# Patient Record
Sex: FEMALE | Race: WHITE | NOT HISPANIC OR LATINO
[De-identification: names, ages, dates, MRNs, and addresses within clinical notes are randomized per-mention and may not be internally consistent; named-entity substitution may affect disease eponyms.]

---

## 2016-12-26 NOTE — H&P ADULT. - COMMENTS
Patient responds no to all review of systems questions, but patient altered at bedside. Unable to obtain full ROS

## 2016-12-26 NOTE — H&P ADULT. - ASSESSMENT
81 y/o F PMH HTN, breast ca s/p bilateral mastectomy, diverticulitis, hx of CHF?, presents with sepsis, source unclear

## 2016-12-26 NOTE — ED ADULT TRIAGE NOTE - CHIEF COMPLAINT QUOTE
Brought in for AMS, abdominal pain, and nausea/vomiting from family home. Family reports AMS started yesterday. 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 Sister name is Joann. Receiving RNs aware pt has b/l mastectomies and need VS. Transferred to [facility].

## 2016-12-26 NOTE — H&P ADULT. - HISTORY OF PRESENT ILLNESS
Patient is a 81 y/o F PMH HTN, breast ca diagnosed ~ 30 years ago s/p b/l mastectomy, history of heart failure?, hx of diverticulitis, who presents with AMS and profuse diarrhea from home. [place] AA[place] x 3. At bedside, patient is ~ AAO x 2, history obtained from nephew. Nephew states that patient was visiting family on Christmas dmitri, when she was noted to have diarrhea and slumped over the toilet. Appeared flushed and lethargic. Family placed her in bed. Yesterday morning, patient had temperature of 101, but after sleeping for several extra hours, returned to her baseline. However, last night, she was found in bed covered with feces, confused. Family brought her to the ED. At bedside, patient reports taking antibiotics, but cannot say when or for how long. Denies current fevers, chills, chest pain, SOB, abdominal pain, nausea, vomiting, diarrhea.     In ED:  Vitals - 97.5, HR 98, BP 83/43, RR 20, O2 sat 95%  Given 2L NS x 1, 1L NS x 1, cipro and flagyl ordered x 1; started on levophed as systolic blood pressure dropped to 70's Patient is a 83 y/o F PMH HTN, breast ca diagnosed ~ 30 years ago s/p b/l mastectomy, history of heart failure?, hx of diverticulitis, who presents with AMS and profuse diarrhea from home. [place] AAO x 3. At bedside, patient is ~ AAO x 2, history obtained from nephew. Nephew states that patient was visiting family on Ghulam dmitri, when she was noted to have diarrhea and slumped over the toilet. Appeared flushed and lethargic. Family placed her in bed. Yesterday morning, patient had temperature of 101, but after sleeping for several extra hours, returned to her baseline. However, last night, she was found in bed covered with feces, confused. Family brought her to the ED. At bedside, patient reports taking antibiotics, but cannot say when or for how long. Denies current fevers, chills, chest pain, SOB, abdominal pain, nausea, vomiting, diarrhea.   Per the family, the HCP is unknown. Possible decision maker is someone within the [employer] to make [facility] decisions.     In ED:  Vitals - 97.5, HR 98, BP 83/43, RR 20, O2 sat 95%  Given 2L NS x 1, 1L NS x 1, cipro and flagyl ordered x 1; started on levophed as systolic blood pressure dropped to 70's

## 2016-12-26 NOTE — H&P ADULT. - PROBLEM SELECTOR PLAN 1
- 2/2 to infectious cause from a GI source given the recent hx of diarrhea   - lacate of 4.4  - Hypotensive in the ED needing Levophed   - Blood, Urine and stool cultures   - Ova and parasite and C. Diff colitis sent as well  - Cipro and flagyl

## 2016-12-26 NOTE — ED PROVIDER NOTE - PROGRESS NOTE DETAILS
as per Dr. Crouch - EF 60s, calc Aortic stenosis, on ASA, metprolol 25, Enalapril 5mg BID, Gabapentin 300TID, HCTZ12.5 QD, letrozol 2.5, atorv 20, vits.

## 2016-12-26 NOTE — H&P ADULT. - LAB RESULTS AND INTERPRETATION
Blood work personally reviewed - notable for WBC ~19 with 13% bands; elevated lactate of 4.4; acute renal failure with Cr of 4; metabolic acidosis

## 2016-12-26 NOTE — ED PROVIDER NOTE - OBJECTIVE STATEMENT
82F with nephew thsi weekend, ahd n/diarrea 12/24, felt better next day but now tired/lethargic, this AM had diarrhea all over the comade;    FS: 145, BP calf: 82/60;   EKG: NSR   pmh: b/l mastectomy  meds: unknown;  family: coming;    ? slurred speech per family; 82F h/o diverticulitis, and b/l mastectomy p/w diarrhea  since12/24, felt better next day but now tired/lethargic, this AM had diarrhea all over the comade; pt denies cp/sob/abdominal pain/vomiting or h/o cdiff in past;     FS: 145, BP calf: 82/60;   EKG: NSR   pmh: b/l mastectomy  meds: unknown;  family: coming;    ? slurred speech per family;

## 2016-12-26 NOTE — ED PROCEDURE NOTE - PROCEDURE ADDITIONAL DETAILS
Triple lumen central access placed in right IJ vein under dynamic ultrasound guidance under sterile conditions with catheter localized in the vein and confirmed on CXR.  Well tolerated and no complications.  See image and report in chart.  Bhumi [id number]
central line was in RA on x-ray, will be pulled back

## 2016-12-26 NOTE — ED ADULT NURSE NOTE - OBJECTIVE STATEMENT
patient brought in by EMS as AMS, as per family patient is been having abdominal pain with n/v/since 2 days, patient came in covered in dry feces .breathing spontaneously. CM shows sinus rhythm. sepsis work up done. continue to monitor.

## 2016-12-26 NOTE — ED PROVIDER NOTE - MEDICAL DECISION MAKING DETAILS
diarrhea, hypotensive liklely 2/2 diarrhea,  -cbc, cmp, central line, ivf, treat for pressumed cdiff, re-assess

## 2016-12-26 NOTE — ED PROVIDER NOTE - CRITICAL CARE PROVIDED
consult w/ pt's family directly relating to pts condition/direct patient care (not related to procedure)/consultation with other physicians/documentation

## 2016-12-26 NOTE — ED PROVIDER NOTE - ATTENDING CONTRIBUTION TO CARE
Manning: 82 yof with diverticulitis and bilateral mastectomy history, with diarrhea for 3 days found today weak and not very responsive with copious amounts of stool, EMS found pt to have low BP.  IN ED, pt awake, answers questions, no recent antibx use, BP 80s, , 97.5 rectal, green stool all over pt and legs and bed, cold clammy ext with poor peripheral pulses, clear lungs, chest with bilateral mast scars, abd soft with mild epigastric tn, no edema.  Central line placed and BP improved after 3L for short time, pt more alert.  ICU called when BP decreased again and Levophed started.  Nicole in place with 400cc urine only.  Labs show infection, possible Cdiff, will treat. CT abd.  Will try and call PMD and get old records.

## 2016-12-26 NOTE — ED PROVIDER NOTE - CARE PLAN
Principal Discharge DX:	Sepsis, due to unspecified organism  Secondary Diagnosis:	Hypotension, unspecified hypotension type  Secondary Diagnosis:	Diarrhea, unspecified type

## 2016-12-28 NOTE — PHYSICAL THERAPY INITIAL EVALUATION ADULT - PERTINENT HX OF CURRENT PROBLEM, REHAB EVAL
Patient is a 81 y/o F PMH HTN, breast ca diagnosed ~ 30 years ago s/p b/l mastectomy, history of heart failure?, hx of diverticulitis, who presents with AMS and profuse diarrhea from home. [place] x 3. At bedside, patient is ~ AAO x 2, history obtained from nephew.

## 2017-01-01 NOTE — OCCUPATIONAL THERAPY INITIAL EVALUATION ADULT - PLANNED THERAPY INTERVENTIONS, OT EVAL
ADL retraining/bed mobility training/strengthening/balance training/parent/caregiver training.../transfer training/motor coordination training

## 2017-01-02 NOTE — DISCHARGE NOTE ADULT - CONDITIONS AT DISCHARGE
patient alert and clinically stable. patient vss and afebrile. patient received iv abx. blister around lip area. patient clinically stable for discharged to Rehab. Nephrostomy tube, drsg dry and intact. will continue to monitor.

## 2017-01-02 NOTE — DISCHARGE NOTE ADULT - COMMUNITY RESOURCES
[facility] [address] [place] [id number] [phone]   [facility] [phone] [facility] [address] [place] [id number] [phone]   [facility] [phone]

## 2017-01-02 NOTE — DISCHARGE NOTE ADULT - PROVIDER TOKENS
FREE:[LAST:[Zaida],FIRST:[Saúl],PHONE:[phone]],FAX:[(   )    -],ADDRESS:[[address] [place] [address] [address] [place] [address]]] FREE:[LAST:[Cerda],FIRST:[Saúl],PHONE:[phone]],FAX:[(   )    -],ADDRESS:[[address] [place] [address]]],TOKEN:'[id number]'

## 2017-01-02 NOTE — DISCHARGE NOTE ADULT - CARE PROVIDER_API CALL
Saúl Cerda  [address]  [place] [address]  Phone: [phone]  Fax: (       - Saúl Cerda  [address]  [address]  [place] [address]  Phone: [phone]  Fax: (   )    -    Hoenig, David M (MD), Urology  [address] [place] [address]  Phone: [phone]  Fax: [phone]

## 2017-01-02 NOTE — DISCHARGE NOTE ADULT - CARE PROVIDERS DIRECT ADDRESSES
,DirectAddress_Unknown,DirectAddress_Unknown ,DirectAddress_Unknown,[email or web address],DirectAddress_Unknown

## 2017-01-02 NOTE — DISCHARGE NOTE ADULT - HOSPITAL COURSE
HPI	  Patient is a 81 y/o F PMH HTN, breast ca diagnosed ~ 30 years ago s/p b/l mastectomy, history of heart failure?, hx of diverticulitis, who presents with AMS and profuse diarrhea from home. [place] x 3. At bedside, patient is ~ AAO x 2, history obtained from nephew. Nephew states that patient was visiting family on Ghul[place] dmitri, when she was noted to have diarrhea and slumped over the toilet. Appeared flushed and lethargic. Family placed her in bed. Yesterday morning, patient had temperature of 101, but after sleeping for several extra hours, returned to her baseline. However, last night, she was found in bed covered with feces, confused. Family brought her to the ED. At bedside, patient reports taking antibiotics, but cannot say when or for how long. Denies current fevers, chills, chest pain, SOB, abdominal pain, nausea, vomiting, diarrhea.   Per the family, the HCP is unknown. Possible decision maker is someone within the [employer] to make [facility] decisions.     In ED:  Vitals - 97.5, HR 98, BP 83/43, RR 20, O2 sat 95%  Given 2L NS x 1, 1L NS x 1, cipro and flagyl ordered x 1; started on levophed as systolic blood pressure dropped to 70's    Patient was admitted to the MICU.  Continued on cipro/flagyl as suspected abdominal cause of sepsis.  Lactate 4.8.  Patient had CT CAP, R hydronephrosis w/ suspicion of pyelonephritis.  Urology called for R ureteral stone, patient had R nephrostomy tube placed.  Cipro d/c'd, started on Zosyn.  Flagyl was d\c'd as C. Diff was negative.  Blood Cultures grew GNR.  Patient continued on zosyn, blood pressure improved, patient started on midodrine and was able to be weaned off pressors.  GNR sensitivities showed pan-sensitive E.Coli, Zosyn changed to ceftriaxone.  Patient was transferred to the floor.      Patient developed vasicular lesions on lips, was started on topical acyclovir.      PT evaluated patient, recommended inpatient rehab.  Patient was discharged on ___ to inpatient rehab.  She will f/u with her primary care physician, her cardiologist, and urology.    She was also found to have pulmonary edema on bedside ultrasound.      Patient also had SYED with Cr 4.4.   Renal consulted, likely 2/2 hypotension from sepsis.  Creatinine improved throughout course of admission. Patient is a 83 y/o F PMH HTN, breast ca diagnosed ~ 30 years ago s/p b/l mastectomy, history of heart failure?, hx of diverticulitis admitted 12/26 for AMS and diarrhea found to be in severe septic shock 2/2 pyelonephritis.    In ED:  Vitals - 97.5, HR 98, BP 83/43, RR 20, O2 sat 95%  Given 2L NS x 1, 1L NS x 1, cipro and flagyl ordered x 1; started on levophed as systolic blood pressure dropped to 70's    Patient was admitted to the MICU.  Continued on cipro/flagyl as suspected abdominal cause of sepsis.  Lactate 4.8.  Patient had CT CAP, R hydronephrosis w/ suspicion of pyelonephritis.  Patient with Cr to 4.4.  Renal consulted, thought likely 2/2 hypotension from sepsis.  Urology called for R ureteral stone, patient had R nephrostomy tube placed.  Cipro d/c'd, started on Zosyn.  Flagyl was d\c'd as C. Diff was negative.  Blood Cultures grew GNR.  Patient continued on zosyn, blood pressure improved, patient started on midodrine and was able to be weaned off pressors.  Patient's Cr continued to improve throughout admission to WNL by discharge.    GNR sensitivities showed pan-sensitive E.Coli, Zosyn changed to ceftriaxone.  Patient cleared blood cultures 12/30.  Patient was transferred to the floor and was weaned off midodrine.  For discharge, ID recommended PO cefuroxime.    Patient developed vesicular lesions on lips, was started on topical acyclovir.      PT evaluated patient, recommended inpatient rehab.  Patient was discharged on ___ to inpatient rehab.  She will f/u with her primary care physician, her cardiologist, and urology. Patient is a 83 y/o F PMH HTN, breast ca diagnosed ~ 30 years ago s/p b/l mastectomy, history of heart failure?, hx of diverticulitis admitted 12/26 for AMS and diarrhea found to be in severe septic shock 2/2 pyelonephritis.    In ED:  Vitals - 97.5, HR 98, BP 83/43, RR 20, O2 sat 95%  Given 2L NS x 1, 1L NS x 1, cipro and flagyl ordered x 1; started on levophed as systolic blood pressure dropped to 70's    Patient was admitted to the MICU.  Continued on cipro/flagyl as suspected abdominal cause of sepsis.  Lactate 4.8.  Patient had CT CAP, R hydronephrosis w/ suspicion of pyelonephritis.  Patient with Cr to 4.4.  Renal consulted, thought likely 2/2 hypotension from sepsis.  Urology called for R ureteral stone, patient had R nephrostomy tube placed.  Cipro d/c'd, started on Zosyn.  Flagyl was d\c'd as C. Diff was negative.  Blood Cultures grew GNR.  Patient continued on zosyn, blood pressure improved, patient started on midodrine and was able to be weaned off pressors.  Patient's Cr continued to improve throughout admission to WNL by discharge.    GNR sensitivities showed pan-sensitive E.Coli, Zosyn changed to ceftriaxone.  Patient cleared blood cultures 12/30.  Patient was transferred to the floor and was weaned off midodrine.  For discharge, ID recommended PO cefuroxime.    Patient developed vesicular lesions on lips, was started on topical acyclovir.      PT evaluated patient, recommended inpatient rehab.  Patient was discharged on 1/4/17 to inpatient rehab.  She will f/u with her primary care physician, her cardiologist, and urology.

## 2017-01-02 NOTE — DISCHARGE NOTE ADULT - ADDITIONAL INSTRUCTIONS
- Follow up with your primary care physician.  - For further care of your nephrostomy tube, please follow up with Dr. Hoenig (Urology, number listed below).

## 2017-01-02 NOTE — DISCHARGE NOTE ADULT - MEDICATION SUMMARY - MEDICATIONS TO TAKE
I will START or STAY ON the medications listed below when I get home from the hospital:    aspirin 81 mg oral tablet  -- 1 tab(s) by mouth once a day  -- Indication: For Need for prophylactic measure    enalapril 5 mg oral tablet  -- 1 tab(s) by mouth once a day  -- Indication: For Hypertension    gabapentin 600 mg oral tablet  -- 1 tab(s) by mouth 3 times a day  -- PCP attempting to wean  -- Indication: For Pain    loratadine 10 mg oral tablet  -- 1 tab(s) by mouth once a day  -- Indication: For GERD    atorvastatin 20 mg oral tablet  -- 1 tab(s) by mouth once a day  -- Indication: For HLD    letrozole 2.5 mg oral tablet  -- 1 tab(s) by mouth once a day  -- Indication: For Breast cancer    Toprol-XL 25 mg oral tablet, extended release  -- 1 tab(s) by mouth once a day  -- Indication: For Hypertension    Ceftin 250 mg oral tablet  -- 1 tab(s) by mouth 2 times a day  -- Finish all this medication unless otherwise directed by prescriber.  Medication should be taken with plenty of water.  Take with food or milk.    -- Indication: For Sepsis due to urinary tract infection    acyclovir 5% topical ointment  -- 1 application on skin 4 times a day  -- Indication: For Cold sores    petrolatum topical ointment  -- 1 application on skin 2 times a day  -- Indication: For Cold sores    hydroCHLOROthiazide 12.5 mg oral tablet  -- 1 tab(s) by mouth once a day  -- Indication: For Hypertension    Vitamin D3 2000 intl units oral tablet  -- 1 tab(s) by mouth once a day  -- Indication: For Nutrition I will START or STAY ON the medications listed below when I get home from the hospital:    aspirin 81 mg oral tablet  -- 1 tab(s) by mouth once a day  -- Indication: For Need for prophylactic measure    enalapril 5 mg oral tablet  -- 1 tab(s) by mouth once a day  -- Indication: For Hypertension    gabapentin 600 mg oral tablet  -- 1 tab(s) by mouth 3 times a day  -- PCP attempting to wean  -- Indication: For Pain    loratadine 10 mg oral tablet  -- 1 tab(s) by mouth once a day  -- Indication: For GERD    atorvastatin 20 mg oral tablet  -- 1 tab(s) by mouth once a day  -- Indication: For HLD    letrozole 2.5 mg oral tablet  -- 1 tab(s) by mouth once a day  -- Indication: For Breast cancer    Toprol-XL 25 mg oral tablet, extended release  -- 1 tab(s) by mouth once a day  -- Indication: For Hypertension    Ceftin 250 mg oral tablet  -- 1 tab(s) by mouth 2 times a day x7 days  -- Indication: For Sepsis due to urinary tract infection    acyclovir 5% topical ointment  -- 1 application on skin 4 times a day  -- Indication: For cold sores    petrolatum topical ointment  -- 1 application on skin 2 times a day  -- Indication: For cold sores    hydroCHLOROthiazide 12.5 mg oral tablet  -- 1 tab(s) by mouth once a day  -- Indication: For Hypertension    Vitamin D3 2000 intl units oral tablet  -- 1 tab(s) by mouth once a day  -- Indication: For Nutrition

## 2017-01-02 NOTE — DISCHARGE NOTE ADULT - CARE PLAN
Principal Discharge DX:	Sepsis due to urinary tract infection  Goal:	to treat your infection  Instructions for follow-up, activity and diet:	You were found to have sepsis from a urinary tract infection.  You were started on antibiotics.  Please continue your antibiotics as prescribed and follow up with your primary care physician.  Secondary Diagnosis:	SYED (acute kidney injury)  Instructions for follow-up, activity and diet:	You were found to have kindey injury from your infection.  Your kidney function improved.  Secondary Diagnosis:	Stone in kidney  Instructions for follow-up, activity and diet:	You were found to have a stone in your kidney.  You had a nephrostomy tube placed by urology.  Please follow up with urology. You can call ___ to make an appointment. Principal Discharge DX:	Sepsis due to urinary tract infection  Goal:	Continue antibiotics, and follow up with your primary care physician.  Instructions for follow-up, activity and diet:	You were found to have sepsis from a urinary tract infection.  You were started on antibiotics.  Please continue your antibiotics as prescribed and follow up with your primary care physician.  Secondary Diagnosis:	SYED (acute kidney injury)  Goal:	Follow up with your primary care physician.  Instructions for follow-up, activity and diet:	You were found to have kidney injury from your infection.  Your kidney function improved.  Secondary Diagnosis:	Stone in kidney  Goal:	Follow up with Urology for further management.  Instructions for follow-up, activity and diet:	You were found to have a stone in your kidney.  You had a nephrostomy tube placed by urology.  Please follow up with urology. You can call ___ to make an appointment. Principal Discharge DX:	Sepsis due to urinary tract infection  Goal:	Continue antibiotics, and follow up with your primary care physician.  Instructions for follow-up, activity and diet:	You were found to have sepsis from a urinary tract infection.  You were started on antibiotics.  Please continue your antibiotics as prescribed and follow up with your primary care physician.  Secondary Diagnosis:	SYED (acute kidney injury)  Goal:	Follow up with your primary care physician.  Instructions for follow-up, activity and diet:	You were found to have kidney injury from your infection.  Your kidney function improved.  Secondary Diagnosis:	Stone in kidney  Goal:	Follow up with Urology for further management.  Instructions for follow-up, activity and diet:	You were found to have a stone in your kidney.  You had a nephrostomy tube placed by urology.  Please follow up with urology. You can call [phone] to make an appointment. Principal Discharge DX:	Sepsis due to urinary tract infection  Goal:	Continue antibiotics, and follow up with your primary care physician.  Instructions for follow-up, activity and diet:	You were found to have sepsis from a urinary tract infection.  You were started on antibiotics.  Please continue your antibiotics as prescribed and follow up with your primary care physician.  Secondary Diagnosis:	SYED (acute kidney injury)  Goal:	Follow up with your primary care physician.  Instructions for follow-up, activity and diet:	You were found to have kidney injury from your infection.  Your kidney function improved.  Secondary Diagnosis:	Stone in kidney  Goal:	Follow up with Urology for further management.  Instructions for follow-up, activity and diet:	You were found to have a stone in your kidney.  You had a nephrostomy tube placed by urology.  Please follow up with urology. You can call [phone] to make an appointment. Principal Discharge DX:	Sepsis due to urinary tract infection  Goal:	Continue antibiotics, and follow up with your primary care physician.  Instructions for follow-up, activity and diet:	You were found to have sepsis from a urinary tract infection.  You were started on antibiotics.  Please continue your antibiotics as prescribed and follow up with your primary care physician.  Secondary Diagnosis:	SYED (acute kidney injury)  Goal:	Follow up with your primary care physician.  Instructions for follow-up, activity and diet:	You were found to have kidney injury from your infection.  Your kidney function improved.  Secondary Diagnosis:	Stone in kidney  Goal:	Follow up with Urology for further management.  Instructions for follow-up, activity and diet:	You were found to have a stone in your kidney.  You had a nephrostomy tube placed by urology.  Please follow up with urology. You can call [phone] to make an appointment. Principal Discharge DX:	Sepsis due to urinary tract infection  Goal:	Continue antibiotics, and follow up with your primary care physician.  Instructions for follow-up, activity and diet:	You were found to have sepsis from a urinary tract infection.  You were started on antibiotics.  Please continue your antibiotics as prescribed and follow up with your primary care physician.  Secondary Diagnosis:	SYED (acute kidney injury)  Goal:	Follow up with your primary care physician.  Instructions for follow-up, activity and diet:	You were found to have kidney injury from your infection.  Your kidney function improved.  Secondary Diagnosis:	Stone in kidney  Goal:	Follow up with Urology for further management.  Instructions for follow-up, activity and diet:	You were found to have a stone in your kidney.  You had a nephrostomy tube placed by urology.  Please follow up with urology. You can call [phone] to make an appointment.  Secondary Diagnosis:	Diastolic dysfunction  Goal:	Follow up with Dr. Cerda.  Instructions for follow-up, activity and diet:	Please follow up with your cardiologist for further management of your heart function.  An echo may be indicated per Dr. Cerda' judgment. Principal Discharge DX:	Sepsis due to urinary tract infection  Goal:	Continue antibiotics, and follow up with your primary care physician.  Instructions for follow-up, activity and diet:	You were found to have sepsis from a urinary tract infection.  You were started on antibiotics.  Please continue your antibiotics as prescribed and follow up with your primary care physician.  Secondary Diagnosis:	SYED (acute kidney injury)  Goal:	Follow up with your primary care physician.  Instructions for follow-up, activity and diet:	You were found to have kidney injury from your infection.  Your kidney function improved.  Secondary Diagnosis:	Stone in kidney  Goal:	Follow up with Urology for further management.  Instructions for follow-up, activity and diet:	You were found to have a stone in your kidney.  You had a nephrostomy tube placed by urology.  Please follow up with urology. You can call [phone] to make an appointment.  Secondary Diagnosis:	Diastolic dysfunction  Goal:	Follow up with Dr. Cerda.  Instructions for follow-up, activity and diet:	Please follow up with your cardiologist for further management of your heart function.  An echo may be indicated per Dr. Cerda' judgment. Principal Discharge DX:	Sepsis due to urinary tract infection  Goal:	Continue antibiotics, and follow up with your primary care physician.  Instructions for follow-up, activity and diet:	You were found to have sepsis from a urinary tract infection.  You were started on antibiotics.  Please continue your antibiotics as prescribed and follow up with your primary care physician.  Secondary Diagnosis:	SYED (acute kidney injury)  Goal:	Follow up with your primary care physician.  Instructions for follow-up, activity and diet:	You were found to have kidney injury from your infection.  Your kidney function improved.  Secondary Diagnosis:	Stone in kidney  Goal:	Follow up with Urology for further management.  Instructions for follow-up, activity and diet:	You were found to have a stone in your kidney.  You had a nephrostomy tube placed by urology.  Please follow up with urology. You can call [phone] to make an appointment.  Secondary Diagnosis:	Diastolic dysfunction  Goal:	Follow up with Dr. Cerda.  Instructions for follow-up, activity and diet:	Please follow up with your cardiologist for further management of your heart function.  An echo may be indicated per Dr. Cerda' judgment. Principal Discharge DX:	Sepsis due to urinary tract infection  Goal:	Continue antibiotics, and follow up with your primary care physician.  Instructions for follow-up, activity and diet:	You were found to have sepsis from a urinary tract infection.  You were started on antibiotics.  Please continue your antibiotics as prescribed and follow up with your primary care physician.  Secondary Diagnosis:	SYED (acute kidney injury)  Goal:	Follow up with your primary care physician.  Instructions for follow-up, activity and diet:	You were found to have kidney injury from your infection.  Your kidney function improved.  Secondary Diagnosis:	Stone in kidney  Goal:	Follow up with Urology for further management.  Instructions for follow-up, activity and diet:	You were found to have a stone in your kidney.  You had a nephrostomy tube placed by urology.  Please follow up with urology. You can call [phone] to make an appointment.  Secondary Diagnosis:	Diastolic dysfunction  Goal:	Follow up with Dr. Cerda.  Instructions for follow-up, activity and diet:	Please follow up with your cardiologist for further management of your heart function.  An echo may be indicated per Dr. Cerda' judgment.

## 2017-01-02 NOTE — DISCHARGE NOTE ADULT - PLAN OF CARE
to treat your infection You were found to have sepsis from a urinary tract infection.  You were started on antibiotics.  Please continue your antibiotics as prescribed and follow up with your primary care physician. You were found to have kindey injury from your infection.  Your kidney function improved. You were found to have a stone in your kidney.  You had a nephrostomy tube placed by urology.  Please follow up with urology. You can call ___ to make an appointment. Follow up with your primary care physician. Follow up with Urology for further management. Continue antibiotics, and follow up with your primary care physician. You were found to have kidney injury from your infection.  Your kidney function improved. You were found to have a stone in your kidney.  You had a nephrostomy tube placed by urology.  Please follow up with urology. You can call [phone] to make an appointment. Follow up with Dr. Cerda. Please follow up with your cardiologist for further management of your heart function.  An echo may be indicated per Dr. Cerda' judgment.

## 2017-01-11 PROBLEM — Z00.00 ENCOUNTER FOR PREVENTIVE HEALTH EXAMINATION: Status: ACTIVE | Noted: 2017-01-11

## 2017-01-20 ENCOUNTER — APPOINTMENT (OUTPATIENT)
Dept: UROLOGY | Facility: CLINIC | Age: 83
End: 2017-01-20

## 2018-11-26 NOTE — OCCUPATIONAL THERAPY INITIAL EVALUATION ADULT - PHYSICAL ASSIST/NONPHYSICAL ASSIST: STAND/SIT, REHAB EVAL
Results   CHLAMYDIA / GC BY NUCLEIC ACID AMPLIFICATION   07 Feb 2017 12:01 AM  * Negative, 09 Feb 2017  -   CHLAMYDIA TRACHOMATIS BY NUCLEIC ACID AMPLIFICATION: NEGATIVE  Reference Range: NEGATIVE  -   NEISSERIA GONORRHOEAE BY NUCLEIC ACID AMPLIFICATION: NEGATIVE  Reference Range: NEGATIVE  -   SOURCE: ENDOCERVIX.  Discussed   Screening for chlamydia and gonorrhoeae are NEGATIVE.   1 person assist

## 2018-12-24 ENCOUNTER — RX RENEWAL (OUTPATIENT)
Age: 84
End: 2018-12-24

## 2018-12-24 DIAGNOSIS — K80.20 CALCULUS OF GALLBLADDER W/OUT CHOLECYSTITIS W/OUT OBSTRUCTION: ICD-10-CM

## 2019-02-18 ENCOUNTER — RX RENEWAL (OUTPATIENT)
Age: 85
End: 2019-02-18

## 2019-08-26 ENCOUNTER — RX RENEWAL (OUTPATIENT)
Age: 85
End: 2019-08-26

## 2019-08-26 RX ORDER — URSODIOL 300 MG/1
300 CAPSULE ORAL
Qty: 60 | Refills: 0 | Status: ACTIVE | COMMUNITY
Start: 2018-12-24 | End: 1900-01-01

## 2022-03-23 NOTE — PHYSICAL THERAPY INITIAL EVALUATION ADULT - WEIGHT-BEARING RESTRICTIONS: SIT/STAND, REHAB EVAL
Patient called at 9:16am states that he is running 15 min late for his 9:30am appointment. Provider is aware and agreed to see patient . He might have to wait since there are patients back to back for her.
weight-bearing as tolerated

## 2022-07-14 ENCOUNTER — NON-APPOINTMENT (OUTPATIENT)
Age: 88
End: 2022-07-14

## 2022-10-08 ENCOUNTER — NON-APPOINTMENT (OUTPATIENT)
Age: 88
End: 2022-10-08

## 2023-03-07 NOTE — ED ADULT NURSE NOTE - NS ED NURSE REPORT GIVEN DT
March 7, 2023      [facility]  [address] [id number]      Patient: Jocelyne Granado   MR Number: [id number]   YOB: 1946   Date of Visit: 3/7/2023       Dear Billy Singh: Thank you for referring Jocelyne Granado to me for evaluation/treatment. Below are the relevant portions of my assessment and plan of care. If you have questions, please do not hesitate to call me. I look forward to following Bill along with you.     Sincerely,        Valene Mortimer, MD 26-Dec-2016 17:16

## 2023-04-04 NOTE — PATIENT PROFILE ADULT. - FUNCTIONAL SCREEN CURRENT LEVEL: AMBULATION, MLM
Problem: Safety - Adult  Goal: Free from fall injury  4/4/2023 0829 by Earl Yin LPN  Outcome: Progressing  4/3/2023 2213 by Amber Palomino RN  Outcome: Progressing     Problem: Discharge Planning  Goal: Discharge to home or other facility with appropriate resources  4/4/2023 0829 by Earl Yin LPN  Outcome: Progressing  4/3/2023 2213 by Amber Palomino RN  Outcome: Progressing     Problem: Skin/Tissue Integrity  Goal: Absence of new skin breakdown  Description: 1. Monitor for areas of redness and/or skin breakdown  2. Assess vascular access sites hourly  3. Every 4-6 hours minimum:  Change oxygen saturation probe site  4. Every 4-6 hours:  If on nasal continuous positive airway pressure, respiratory therapy assess nares and determine need for appliance change or resting period. 4/4/2023 0829 by Earl Yin LPN  Outcome: Progressing  4/3/2023 2213 by Amber Palomino RN  Outcome: Progressing     Problem: Confusion  Goal: Confusion, delirium, dementia, or psychosis is improved or at baseline  Description: INTERVENTIONS:  1. Assess for possible contributors to thought disturbance, including medications, impaired vision or hearing, underlying metabolic abnormalities, dehydration, psychiatric diagnoses, and notify attending LIP  2. [place] high risk fall precautions, as indicated  3. Provide frequent short contacts to provide reality reorientation, refocusing and direction  4. Decrease environmental stimuli, including noise as appropriate  5. Monitor and intervene to maintain adequate nutrition, hydration, elimination, sleep and activity  6. If unable to ensure safety without constant attention obtain sitter and review sitter guidelines with assigned personnel  7.  Initiate Psychosocial CNS and Spiritual Care consult, as indicated  4/4/2023 0829 by Earl Yin LPN  Outcome: Progressing  4/3/2023 2213 by Amber Palomino RN  Outcome: Progressing     Problem: ABCDS Injury Assessment  Goal: Absence of (0) independent

## 2023-06-05 ENCOUNTER — NON-APPOINTMENT (OUTPATIENT)
Age: 89
End: 2023-06-05

## 2023-06-07 NOTE — OCCUPATIONAL THERAPY INITIAL EVALUATION ADULT - LEVEL OF INDEPENDENCE: SIT/STAND, REHAB EVAL
minimum assist (75% patients effort) Dutasteride Pregnancy And Lactation Text: This medication is absolutely contraindicated in women, especially during pregnancy and breast feeding. Feminization of male fetuses is possible if taking while pregnant.

## 2023-08-25 ENCOUNTER — NON-APPOINTMENT (OUTPATIENT)
Age: 89
End: 2023-08-25

## 2023-12-21 ENCOUNTER — NON-APPOINTMENT (OUTPATIENT)
Age: 89
End: 2023-12-21

## 2024-05-13 NOTE — PHYSICAL THERAPY INITIAL EVALUATION ADULT - DISCHARGE DISPOSITION, PT EVAL
[de-identified] : 70 year old RHD female presents today with right shoulder pain 4/25/24. No injury reported. Patient states that she developed pain after bottle feeding her granddaughter. 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 The pain has improved, is constant worse with FF and internal rotation. Meloxicam is helpful.  Patient states that she has been having left arm pain for past 6 month, also sustained a fall 6 months ago but right shoulder was not bothering her until recently.   The patient's past medical history, past surgical history, medications and allergies were reviewed by me today with the patient and documented accordingly. In addition, the patient's family and social history, which were noncontributory to this visit, were reviewed also. 
rehabilitation facility

## 2024-12-27 ENCOUNTER — APPOINTMENT (OUTPATIENT)
Dept: CARDIOLOGY | Facility: CLINIC | Age: 89
End: 2024-12-27
Payer: MEDICARE

## 2024-12-27 ENCOUNTER — NON-APPOINTMENT (OUTPATIENT)
Age: 89
End: 2024-12-27

## 2024-12-27 VITALS
HEART RATE: 73 BPM | OXYGEN SATURATION: 96 % | HEIGHT: 64 IN | DIASTOLIC BLOOD PRESSURE: 78 MMHG | SYSTOLIC BLOOD PRESSURE: 139 MMHG | WEIGHT: 162 LBS | BODY MASS INDEX: 27.66 KG/M2

## 2024-12-27 DIAGNOSIS — Z87.891 PERSONAL HISTORY OF NICOTINE DEPENDENCE: ICD-10-CM

## 2024-12-27 DIAGNOSIS — H81.10 BENIGN PAROXYSMAL VERTIGO, UNSPECIFIED EAR: ICD-10-CM

## 2024-12-27 DIAGNOSIS — R01.1 CARDIAC MURMUR, UNSPECIFIED: ICD-10-CM

## 2024-12-27 DIAGNOSIS — I10 ESSENTIAL (PRIMARY) HYPERTENSION: ICD-10-CM

## 2024-12-27 DIAGNOSIS — Z78.9 OTHER SPECIFIED HEALTH STATUS: ICD-10-CM

## 2024-12-27 DIAGNOSIS — E78.5 HYPERLIPIDEMIA, UNSPECIFIED: ICD-10-CM

## 2024-12-27 PROCEDURE — 93000 ELECTROCARDIOGRAM COMPLETE: CPT

## 2024-12-27 PROCEDURE — 99205 OFFICE O/P NEW HI 60 MIN: CPT

## 2024-12-27 PROCEDURE — G2212 PROLONG OUTPT/OFFICE VIS: CPT

## 2024-12-27 RX ORDER — HYDROCHLOROTHIAZIDE 12.5 MG/1
12.5 TABLET ORAL
Refills: 0 | Status: ACTIVE | COMMUNITY

## 2024-12-27 RX ORDER — ENALAPRIL MALEATE 5 MG/1
5 TABLET ORAL TWICE DAILY
Refills: 0 | Status: ACTIVE | COMMUNITY

## 2024-12-27 RX ORDER — ATORVASTATIN CALCIUM 20 MG/1
20 TABLET, FILM COATED ORAL
Refills: 0 | Status: ACTIVE | COMMUNITY

## 2024-12-27 RX ORDER — METOPROLOL SUCCINATE 25 MG/1
25 TABLET, EXTENDED RELEASE ORAL
Refills: 0 | Status: ACTIVE | COMMUNITY

## 2024-12-29 PROBLEM — E78.5 HLD (HYPERLIPIDEMIA): Status: ACTIVE | Noted: 2024-12-27

## 2024-12-29 PROBLEM — I10 BENIGN ESSENTIAL HTN: Status: ACTIVE | Noted: 2024-12-27

## 2024-12-29 PROBLEM — R01.1 CARDIAC MURMUR: Status: ACTIVE | Noted: 2024-12-27

## 2025-01-07 ENCOUNTER — APPOINTMENT (OUTPATIENT)
Dept: GERIATRICS | Facility: CLINIC | Age: 89
End: 2025-01-07
Payer: MEDICARE

## 2025-01-07 ENCOUNTER — RX RENEWAL (OUTPATIENT)
Age: 89
End: 2025-01-07

## 2025-01-07 VITALS
TEMPERATURE: 97 F | DIASTOLIC BLOOD PRESSURE: 64 MMHG | WEIGHT: 161 LBS | BODY MASS INDEX: 27.49 KG/M2 | HEIGHT: 64 IN | SYSTOLIC BLOOD PRESSURE: 124 MMHG | HEART RATE: 70 BPM | OXYGEN SATURATION: 97 %

## 2025-01-07 DIAGNOSIS — D64.9 ANEMIA, UNSPECIFIED: ICD-10-CM

## 2025-01-07 DIAGNOSIS — E78.5 HYPERLIPIDEMIA, UNSPECIFIED: ICD-10-CM

## 2025-01-07 DIAGNOSIS — H91.93 UNSPECIFIED HEARING LOSS, BILATERAL: ICD-10-CM

## 2025-01-07 DIAGNOSIS — Z13.820 ENCOUNTER FOR SCREENING FOR OSTEOPOROSIS: ICD-10-CM

## 2025-01-07 DIAGNOSIS — H81.10 BENIGN PAROXYSMAL VERTIGO, UNSPECIFIED EAR: ICD-10-CM

## 2025-01-07 DIAGNOSIS — I10 ESSENTIAL (PRIMARY) HYPERTENSION: ICD-10-CM

## 2025-01-07 PROCEDURE — G2211 COMPLEX E/M VISIT ADD ON: CPT

## 2025-01-07 PROCEDURE — G0444 DEPRESSION SCREEN ANNUAL: CPT | Mod: 59

## 2025-01-07 PROCEDURE — 99205 OFFICE O/P NEW HI 60 MIN: CPT

## 2025-01-07 RX ORDER — MECLIZINE HYDROCHLORIDE 12.5 MG/1
12.5 TABLET ORAL
Qty: 20 | Refills: 0 | Status: ACTIVE | COMMUNITY
Start: 2025-01-07

## 2025-01-07 RX ORDER — MECLIZINE HCL 25 MG/1
25 TABLET ORAL
Qty: 30 | Refills: 0 | Status: DISCONTINUED | COMMUNITY
Start: 2024-12-30 | End: 2025-01-07

## 2025-01-08 LAB
ALBUMIN SERPL ELPH-MCNC: 4.2 G/DL
ALP BLD-CCNC: 103 U/L
ALT SERPL-CCNC: 12 U/L
ANION GAP SERPL CALC-SCNC: 13 MMOL/L
AST SERPL-CCNC: 18 U/L
BASOPHILS # BLD AUTO: 0.04 K/UL
BASOPHILS NFR BLD AUTO: 0.7 %
BILIRUB SERPL-MCNC: 0.8 MG/DL
BUN SERPL-MCNC: 25 MG/DL
CALCIUM SERPL-MCNC: 9.4 MG/DL
CHLORIDE SERPL-SCNC: 105 MMOL/L
CHOLEST SERPL-MCNC: 143 MG/DL
CO2 SERPL-SCNC: 25 MMOL/L
CREAT SERPL-MCNC: 1.4 MG/DL
CREAT SPEC-SCNC: 55 MG/DL
CREAT/PROT UR: 0.1 RATIO
CYSTATIN C SERPL-MCNC: 1.47 MG/L
EGFR: 36 ML/MIN/1.73M2
EOSINOPHIL # BLD AUTO: 0.15 K/UL
EOSINOPHIL NFR BLD AUTO: 2.5 %
FERRITIN SERPL-MCNC: 101 NG/ML
FOLATE SERPL-MCNC: 10.8 NG/ML
GFR/BSA.PRED SERPLBLD CYS-BASED-ARV: 39 ML/MIN/1.73M2
GLUCOSE SERPL-MCNC: 89 MG/DL
HCT VFR BLD CALC: 38.2 %
HDLC SERPL-MCNC: 59 MG/DL
HGB BLD-MCNC: 12.5 G/DL
IMM GRANULOCYTES NFR BLD AUTO: 0.3 %
IRON SATN MFR SERPL: 31 %
IRON SERPL-MCNC: 91 UG/DL
LDLC SERPL CALC-MCNC: 56 MG/DL
LYMPHOCYTES # BLD AUTO: 0.99 K/UL
LYMPHOCYTES NFR BLD AUTO: 16.7 %
MAN DIFF?: NORMAL
MCHC RBC-ENTMCNC: 30.5 PG
MCHC RBC-ENTMCNC: 32.7 G/DL
MCV RBC AUTO: 93.2 FL
MONOCYTES # BLD AUTO: 0.45 K/UL
MONOCYTES NFR BLD AUTO: 7.6 %
NEUTROPHILS # BLD AUTO: 4.28 K/UL
NEUTROPHILS NFR BLD AUTO: 72.2 %
NONHDLC SERPL-MCNC: 83 MG/DL
PLATELET # BLD AUTO: 182 K/UL
POTASSIUM SERPL-SCNC: 4.8 MMOL/L
PROT SERPL-MCNC: 6 G/DL
PROT UR-MCNC: 4 MG/DL
RBC # BLD: 4.1 M/UL
RBC # BLD: 4.1 M/UL
RBC # FLD: 13.3 %
RETICS # AUTO: 1.1 %
RETICS AGGREG/RBC NFR: 45.9 K/UL
SODIUM SERPL-SCNC: 144 MMOL/L
TIBC SERPL-MCNC: 292 UG/DL
TRIGL SERPL-MCNC: 163 MG/DL
TSH SERPL-ACNC: 1.32 UIU/ML
UIBC SERPL-MCNC: 201 UG/DL
VIT B12 SERPL-MCNC: 339 PG/ML
WBC # FLD AUTO: 5.93 K/UL

## 2025-01-23 DIAGNOSIS — M25.551 PAIN IN RIGHT HIP: ICD-10-CM

## 2025-01-28 ENCOUNTER — APPOINTMENT (OUTPATIENT)
Dept: CARDIOLOGY | Facility: CLINIC | Age: 89
End: 2025-01-28
Payer: MEDICARE

## 2025-01-28 VITALS
BODY MASS INDEX: 27.49 KG/M2 | OXYGEN SATURATION: 95 % | HEIGHT: 64 IN | SYSTOLIC BLOOD PRESSURE: 141 MMHG | DIASTOLIC BLOOD PRESSURE: 87 MMHG | WEIGHT: 161 LBS | HEART RATE: 68 BPM

## 2025-01-28 VITALS — DIASTOLIC BLOOD PRESSURE: 68 MMHG | SYSTOLIC BLOOD PRESSURE: 132 MMHG

## 2025-01-28 DIAGNOSIS — I10 ESSENTIAL (PRIMARY) HYPERTENSION: ICD-10-CM

## 2025-01-28 DIAGNOSIS — E78.5 HYPERLIPIDEMIA, UNSPECIFIED: ICD-10-CM

## 2025-01-28 DIAGNOSIS — R01.1 CARDIAC MURMUR, UNSPECIFIED: ICD-10-CM

## 2025-01-28 PROCEDURE — 99213 OFFICE O/P EST LOW 20 MIN: CPT

## 2025-02-15 NOTE — OCCUPATIONAL THERAPY INITIAL EVALUATION ADULT - PHYSICAL ASSIST/NONPHYSICAL ASSIST: SIT/SUPINE, REHAB EVAL
You were seen and evaluated in the emergency department for right-sided nosebleeding.  You are found to have no active bleeding, and your nasal packing was removed.  You do not require antibiotics as you no longer have any nasal packing    Recommend always using a mask while tiling, jackhammering, doing anything with fine particles.  Recommend humidifier at night especially winter    Please follow-up with your primary care provider for further management of your nosebleeds.  Please return to the emergency department if you develop persistent nosebleeds, feeling faint.     1 person assist

## 2025-02-19 ENCOUNTER — RESULT REVIEW (OUTPATIENT)
Age: 89
End: 2025-02-19

## 2025-02-21 DIAGNOSIS — I63.81 OTHER CEREBRAL INFARCTION DUE TO OCCLUSION OR STENOSIS OF SMALL ARTERY: ICD-10-CM

## 2025-02-26 ENCOUNTER — APPOINTMENT (OUTPATIENT)
Dept: CARDIOLOGY | Facility: CLINIC | Age: 89
End: 2025-02-26
Payer: MEDICARE

## 2025-02-26 DIAGNOSIS — R01.1 CARDIAC MURMUR, UNSPECIFIED: ICD-10-CM

## 2025-02-26 PROCEDURE — 93306 TTE W/DOPPLER COMPLETE: CPT

## 2025-03-06 ENCOUNTER — NON-APPOINTMENT (OUTPATIENT)
Age: 89
End: 2025-03-06

## 2025-03-12 ENCOUNTER — APPOINTMENT (OUTPATIENT)
Dept: VASCULAR SURGERY | Facility: CLINIC | Age: 89
End: 2025-03-12
Payer: MEDICARE

## 2025-03-12 VITALS
HEIGHT: 64 IN | BODY MASS INDEX: 28.17 KG/M2 | DIASTOLIC BLOOD PRESSURE: 90 MMHG | SYSTOLIC BLOOD PRESSURE: 145 MMHG | WEIGHT: 165 LBS | HEART RATE: 69 BPM

## 2025-03-12 DIAGNOSIS — I87.2 VENOUS INSUFFICIENCY (CHRONIC) (PERIPHERAL): ICD-10-CM

## 2025-03-12 PROCEDURE — 99203 OFFICE O/P NEW LOW 30 MIN: CPT

## 2025-03-21 ENCOUNTER — APPOINTMENT (OUTPATIENT)
Dept: GERIATRICS | Facility: CLINIC | Age: 89
End: 2025-03-21
Payer: MEDICARE

## 2025-03-21 VITALS
BODY MASS INDEX: 28.68 KG/M2 | OXYGEN SATURATION: 95 % | DIASTOLIC BLOOD PRESSURE: 62 MMHG | SYSTOLIC BLOOD PRESSURE: 142 MMHG | HEIGHT: 64 IN | TEMPERATURE: 97.1 F | HEART RATE: 68 BPM | WEIGHT: 168 LBS

## 2025-03-21 DIAGNOSIS — I63.81 OTHER CEREBRAL INFARCTION DUE TO OCCLUSION OR STENOSIS OF SMALL ARTERY: ICD-10-CM

## 2025-03-21 DIAGNOSIS — Z13.820 ENCOUNTER FOR SCREENING FOR OSTEOPOROSIS: ICD-10-CM

## 2025-03-21 DIAGNOSIS — N18.32 CHRONIC KIDNEY DISEASE, STAGE 3B: ICD-10-CM

## 2025-03-21 DIAGNOSIS — I10 ESSENTIAL (PRIMARY) HYPERTENSION: ICD-10-CM

## 2025-03-21 DIAGNOSIS — Z71.89 OTHER SPECIFIED COUNSELING: ICD-10-CM

## 2025-03-21 DIAGNOSIS — H81.10 BENIGN PAROXYSMAL VERTIGO, UNSPECIFIED EAR: ICD-10-CM

## 2025-03-21 PROCEDURE — G2211 COMPLEX E/M VISIT ADD ON: CPT

## 2025-03-21 PROCEDURE — 99215 OFFICE O/P EST HI 40 MIN: CPT

## 2025-03-26 ENCOUNTER — APPOINTMENT (OUTPATIENT)
Dept: VASCULAR SURGERY | Facility: CLINIC | Age: 89
End: 2025-03-26
Payer: MEDICARE

## 2025-03-26 DIAGNOSIS — I83.892 VARICOSE VEINS OF LEFT LOWER EXTREMITY WITH OTHER COMPLICATIONS: ICD-10-CM

## 2025-03-26 DIAGNOSIS — I87.2 VENOUS INSUFFICIENCY (CHRONIC) (PERIPHERAL): ICD-10-CM

## 2025-03-26 PROCEDURE — 99214 OFFICE O/P EST MOD 30 MIN: CPT

## 2025-03-26 PROCEDURE — 93970 EXTREMITY STUDY: CPT

## 2025-03-26 RX ORDER — LOSARTAN POTASSIUM AND HYDROCHLOROTHIAZIDE 12.5; 5 MG/1; MG/1
50-12.5 TABLET ORAL DAILY
Qty: 60 | Refills: 0 | Status: DISCONTINUED | COMMUNITY
Start: 2025-03-21 | End: 2025-03-26

## 2025-03-26 RX ORDER — ENALAPRIL MALEATE 5 MG/1
5 TABLET ORAL
Refills: 0 | Status: ACTIVE | COMMUNITY

## 2025-03-26 RX ORDER — LOSARTAN POTASSIUM 100 MG/1
TABLET, FILM COATED ORAL
Refills: 0 | Status: ACTIVE | COMMUNITY

## 2025-03-30 PROBLEM — I83.892 VARICOSE VEINS OF LEFT LOWER EXTREMITY WITH EDEMA: Status: ACTIVE | Noted: 2025-03-26

## 2025-04-17 ENCOUNTER — APPOINTMENT (OUTPATIENT)
Dept: GERIATRICS | Facility: CLINIC | Age: 89
End: 2025-04-17
Payer: MEDICARE

## 2025-04-17 VITALS
BODY MASS INDEX: 28.17 KG/M2 | HEART RATE: 71 BPM | DIASTOLIC BLOOD PRESSURE: 78 MMHG | HEIGHT: 64 IN | TEMPERATURE: 97 F | OXYGEN SATURATION: 96 % | WEIGHT: 165 LBS | SYSTOLIC BLOOD PRESSURE: 180 MMHG

## 2025-04-17 DIAGNOSIS — N64.4 MASTODYNIA: ICD-10-CM

## 2025-04-17 DIAGNOSIS — I10 ESSENTIAL (PRIMARY) HYPERTENSION: ICD-10-CM

## 2025-04-17 PROCEDURE — 99214 OFFICE O/P EST MOD 30 MIN: CPT

## 2025-04-17 PROCEDURE — G2211 COMPLEX E/M VISIT ADD ON: CPT

## 2025-04-17 RX ORDER — LIDOCAINE 4% 4 G/100G
4 CREAM TOPICAL
Qty: 1 | Refills: 0 | Status: ACTIVE | COMMUNITY
Start: 2025-04-17 | End: 1900-01-01

## 2025-04-18 PROBLEM — N64.4 BREAST PAIN, RIGHT: Status: ACTIVE | Noted: 2025-04-17

## 2025-04-22 ENCOUNTER — APPOINTMENT (OUTPATIENT)
Dept: GERIATRICS | Facility: CLINIC | Age: 89
End: 2025-04-22

## 2025-04-23 ENCOUNTER — RESULT REVIEW (OUTPATIENT)
Age: 89
End: 2025-04-23

## 2025-04-29 ENCOUNTER — TRANSCRIPTION ENCOUNTER (OUTPATIENT)
Age: 89
End: 2025-04-29

## 2025-05-02 ENCOUNTER — RX RENEWAL (OUTPATIENT)
Age: 89
End: 2025-05-02

## 2025-05-05 ENCOUNTER — APPOINTMENT (OUTPATIENT)
Dept: GERIATRICS | Facility: CLINIC | Age: 89
End: 2025-05-05
Payer: MEDICARE

## 2025-05-05 VITALS
OXYGEN SATURATION: 95 % | SYSTOLIC BLOOD PRESSURE: 140 MMHG | DIASTOLIC BLOOD PRESSURE: 80 MMHG | WEIGHT: 164 LBS | HEART RATE: 77 BPM | HEIGHT: 64 IN | BODY MASS INDEX: 28 KG/M2

## 2025-05-05 DIAGNOSIS — H91.93 UNSPECIFIED HEARING LOSS, BILATERAL: ICD-10-CM

## 2025-05-05 DIAGNOSIS — Z71.89 OTHER SPECIFIED COUNSELING: ICD-10-CM

## 2025-05-05 DIAGNOSIS — Z86.2 PERSONAL HISTORY OF DISEASES OF THE BLOOD AND BLOOD-FORMING ORGANS AND CERTAIN DISORDERS INVOLVING THE IMMUNE MECHANISM: ICD-10-CM

## 2025-05-05 DIAGNOSIS — Z13.820 ENCOUNTER FOR SCREENING FOR OSTEOPOROSIS: ICD-10-CM

## 2025-05-05 DIAGNOSIS — I63.81 OTHER CEREBRAL INFARCTION DUE TO OCCLUSION OR STENOSIS OF SMALL ARTERY: ICD-10-CM

## 2025-05-05 DIAGNOSIS — I87.2 VENOUS INSUFFICIENCY (CHRONIC) (PERIPHERAL): ICD-10-CM

## 2025-05-05 DIAGNOSIS — I10 ESSENTIAL (PRIMARY) HYPERTENSION: ICD-10-CM

## 2025-05-05 PROCEDURE — G0444 DEPRESSION SCREEN ANNUAL: CPT | Mod: 59

## 2025-05-05 PROCEDURE — G0439: CPT

## 2025-05-07 LAB
ALBUMIN SERPL ELPH-MCNC: 4.5 G/DL
ALP BLD-CCNC: 100 U/L
ALT SERPL-CCNC: 15 U/L
ANION GAP SERPL CALC-SCNC: 13 MMOL/L
AST SERPL-CCNC: 21 U/L
BILIRUB SERPL-MCNC: 1.2 MG/DL
BUN SERPL-MCNC: 22 MG/DL
CALCIUM SERPL-MCNC: 9.4 MG/DL
CHLORIDE SERPL-SCNC: 100 MMOL/L
CO2 SERPL-SCNC: 24 MMOL/L
CREAT SERPL-MCNC: 1.03 MG/DL
EGFRCR SERPLBLD CKD-EPI 2021: 52 ML/MIN/1.73M2
GGT SERPL-CCNC: 12 U/L
GLUCOSE SERPL-MCNC: 88 MG/DL
POTASSIUM SERPL-SCNC: 4.2 MMOL/L
PROT SERPL-MCNC: 6.2 G/DL
SODIUM SERPL-SCNC: 136 MMOL/L

## 2025-06-04 ENCOUNTER — APPOINTMENT (OUTPATIENT)
Dept: VASCULAR SURGERY | Facility: CLINIC | Age: 89
End: 2025-06-04
Payer: MEDICARE

## 2025-06-04 VITALS — HEART RATE: 59 BPM | DIASTOLIC BLOOD PRESSURE: 70 MMHG | SYSTOLIC BLOOD PRESSURE: 115 MMHG

## 2025-06-04 VITALS — HEART RATE: 56 BPM | SYSTOLIC BLOOD PRESSURE: 163 MMHG | DIASTOLIC BLOOD PRESSURE: 82 MMHG

## 2025-06-04 PROCEDURE — 36475 ENDOVENOUS RF 1ST VEIN: CPT | Mod: LT

## 2025-06-11 ENCOUNTER — APPOINTMENT (OUTPATIENT)
Dept: VASCULAR SURGERY | Facility: CLINIC | Age: 89
End: 2025-06-11
Payer: MEDICARE

## 2025-06-11 PROCEDURE — 93971 EXTREMITY STUDY: CPT | Mod: LT

## 2025-06-11 PROCEDURE — 99214 OFFICE O/P EST MOD 30 MIN: CPT

## 2025-07-02 ENCOUNTER — NON-APPOINTMENT (OUTPATIENT)
Age: 89
End: 2025-07-02

## 2025-07-03 ENCOUNTER — APPOINTMENT (OUTPATIENT)
Dept: CARDIOLOGY | Facility: CLINIC | Age: 89
End: 2025-07-03
Payer: MEDICARE

## 2025-07-03 VITALS
BODY MASS INDEX: 28 KG/M2 | HEART RATE: 69 BPM | WEIGHT: 164 LBS | OXYGEN SATURATION: 98 % | SYSTOLIC BLOOD PRESSURE: 142 MMHG | HEIGHT: 64 IN | DIASTOLIC BLOOD PRESSURE: 60 MMHG

## 2025-07-03 PROBLEM — Z98.890 HISTORY OF CARDIAC CATH: Status: RESOLVED | Noted: 2025-07-03 | Resolved: 2025-07-03

## 2025-07-03 PROBLEM — Z92.89 HISTORY OF ECHOCARDIOGRAM: Status: RESOLVED | Noted: 2025-07-03 | Resolved: 2025-07-03

## 2025-07-03 PROCEDURE — 99214 OFFICE O/P EST MOD 30 MIN: CPT

## 2025-07-03 PROCEDURE — 93000 ELECTROCARDIOGRAM COMPLETE: CPT

## 2025-07-08 PROBLEM — I25.10 CAD (CORONARY ARTERY DISEASE): Status: ACTIVE | Noted: 2025-07-03

## 2025-07-11 NOTE — DISCHARGE NOTE ADULT - PATIENT PORTAL LINK FT
“You can access the [facility] Patient Portal, offered by [facility], by registering with the following website: [email or web address]” 2 = A lot of assistance

## 2025-07-17 ENCOUNTER — RESULT REVIEW (OUTPATIENT)
Age: 89
End: 2025-07-17

## 2025-08-11 ENCOUNTER — NON-APPOINTMENT (OUTPATIENT)
Age: 89
End: 2025-08-11

## 2025-08-11 DIAGNOSIS — Z92.89 PERSONAL HISTORY OF OTHER MEDICAL TREATMENT: ICD-10-CM
